# Patient Record
Sex: MALE | Race: WHITE | NOT HISPANIC OR LATINO | Employment: STUDENT | URBAN - METROPOLITAN AREA
[De-identification: names, ages, dates, MRNs, and addresses within clinical notes are randomized per-mention and may not be internally consistent; named-entity substitution may affect disease eponyms.]

---
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
## 2025-05-29 ENCOUNTER — OFFICE VISIT (OUTPATIENT)
Dept: URGENT CARE | Facility: CLINIC | Age: 20
End: 2025-05-29

## 2025-05-29 VITALS
TEMPERATURE: 98.2 F | RESPIRATION RATE: 18 BRPM | SYSTOLIC BLOOD PRESSURE: 117 MMHG | HEART RATE: 108 BPM | WEIGHT: 152.2 LBS | HEIGHT: 66 IN | DIASTOLIC BLOOD PRESSURE: 75 MMHG | OXYGEN SATURATION: 98 % | BODY MASS INDEX: 24.46 KG/M2

## 2025-05-29 DIAGNOSIS — S39.012A STRAIN OF MUSCLE, FASCIA AND TENDON OF LOWER BACK, INITIAL ENCOUNTER: Primary | ICD-10-CM

## 2025-05-29 PROCEDURE — 99214 OFFICE O/P EST MOD 30 MIN: CPT

## 2025-05-29 RX ORDER — METHOCARBAMOL 500 MG/1
500 TABLET, FILM COATED ORAL 4 TIMES DAILY
Qty: 24 TABLET | Refills: 0 | Status: SHIPPED | OUTPATIENT
Start: 2025-05-29

## 2025-05-29 RX ORDER — PREDNISONE 10 MG/1
TABLET ORAL
Qty: 15 TABLET | Refills: 0 | Status: SHIPPED | OUTPATIENT
Start: 2025-05-29 | End: 2025-06-03

## 2025-05-29 NOTE — LETTER
May 29, 2025     Patient: Dg Garrett   YOB: 2005   Date of Visit: 5/29/2025       To Whom it May Concern:    Dg Garrett was seen in my clinic on 5/29/2025. He may return to work on 5/31/25.    If you have any questions or concerns, please don't hesitate to call.         Sincerely,          Per Washington PA-C        CC: No Recipients

## 2025-05-29 NOTE — PATIENT INSTRUCTIONS
Take prednisone as prescribed (starting tomorrow) and Robaxin  Do not take prednisone with other NSAIDs  Do not drive or operate heavy machinery while taking Robaxin  Rest (for no longer than 24 hours)  Stretching exercises  Alternate ice and heat  Consider physical therapy if no improvement after 1 week  Follow up with chiropractic or orthopedic if continues >1 month

## 2025-05-30 NOTE — PROGRESS NOTES
[facility]  Name: Dg Garrett      : 2005      MRN: [id number]  Encounter Provider: Per Washington PA-C  Encounter Date: 2025   Encounter department: [facility] [place]  :  Assessment & Plan  Strain of muscle, fascia and tendon of lower back, initial encounter    Orders:    methocarbamol (ROBAXIN) 500 mg tablet; Take 1 tablet (500 mg total) by mouth 4 (four) times a day    predniSONE 10 mg tablet; Take 5 tablets (50 mg total) by mouth daily for 1 day, THEN 4 tablets (40 mg total) daily for 1 day, THEN 3 tablets (30 mg total) daily for 1 day, THEN 2 tablets (20 mg total) daily for 1 day, THEN 1 tablet (10 mg total) daily for 1 day.        Patient Instructions  Follow up with PCP in 3-5 days.  Proceed to  ER if symptoms worsen.    If tests are performed, our office will contact you with results only if changes need to made to the care plan discussed with you at the visit. [facility]    Chief Complaint:   Chief Complaint   Patient presents with    Back Pain     Middle back pain starting Monday. Denies any injuries to the area, other than lifting a chair. Intensity of pain ranges from a 3 to a 10.      History of Present Illness   19-year-old male presenting with left-sided low back pain x 4 days.  Patient states he was lifting a chair in a very narrow staircase which required him to strain a little bit more.  Feels like since then he has been tweaked his back with left-sided low back pain as well as stiffness.  Has been using heating pads as well as ibuprofen and Tylenol with only mild relief.  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  Denies any numbness or tingling or pain radiating down the leg.  Denies any prior incidence of something like this.  Denies any direct trauma to the area.  Denies any  "saddle anesthesia or incontinence.    Back Pain  Pertinent negatives include no chest pain, dysuria, fever, numbness or weakness.         Review of Systems   Constitutional:  Negative for chills, fatigue, fever and unexpected weight change.   HENT:  Negative for congestion and sore throat.    Respiratory:  Negative for cough and shortness of breath.    Cardiovascular:  Negative for chest pain and palpitations.   Gastrointestinal:  Negative for diarrhea, nausea and vomiting.   Genitourinary:  Negative for decreased urine volume, dysuria and frequency.   Musculoskeletal:  Positive for back pain.   Skin:  Negative for wound.   Neurological:  Negative for weakness, light-headedness and numbness.     Past Medical History   Past Medical History[1]  Past Surgical History[2]  Family History[3]  he reports that he has been smoking cigarettes. He uses smokeless tobacco. He reports current alcohol use. He reports current drug use. Drug: Marijuana.  Current Outpatient Medications   Medication Instructions    cetirizine (ZYRTEC) 10 mg, As needed    IBUPROFEN PO Take by mouth    methocarbamol (ROBAXIN) 500 mg, Oral, 4 times daily    predniSONE 10 mg tablet Take 5 tablets (50 mg total) by mouth daily for 1 day, THEN 4 tablets (40 mg total) daily for 1 day, THEN 3 tablets (30 mg total) daily for 1 day, THEN 2 tablets (20 mg total) daily for 1 day, THEN 1 tablet (10 mg total) daily for 1 day.   Allergies[4]     Objective   /75   Pulse (!) 108   Temp 98.2 °F (36.8 °C)   Resp 18   Ht 5' 6\" (1.676 m)   Wt 69 kg (152 lb 3.2 oz)   SpO2 98%   BMI 24.57 kg/m²      Physical Exam  Vitals and nursing note reviewed.   Constitutional:       General: He is not in acute distress.     Appearance: He is normal weight.   HENT:      Head: Normocephalic and atraumatic.      Right Ear: External ear normal.      Left Ear: External ear normal.      Nose: Nose normal.      Mouth/Throat:      Mouth: Mucous membranes are moist.      Pharynx: " "Oropharynx is clear.     Eyes:      Conjunctiva/sclera: Conjunctivae normal.      Pupils: Pupils are equal, round, and reactive to light.       Cardiovascular:      Rate and Rhythm: Normal rate and regular rhythm.      Pulses: Normal pulses.   Pulmonary:      Effort: Pulmonary effort is normal.     Musculoskeletal:      Comments: No midline thoracic or lumbar tenderness.  Neurovascularly intact with equal bilateral sensation.  No visible spasms bruising or swelling noticeable on exam.  Tenderness to palpation along the left paraspinal muscles at this time.  Negative straight leg raise.     Skin:     General: Skin is warm and dry.      Capillary Refill: Capillary refill takes less than 2 seconds.     Neurological:      General: No focal deficit present.      Mental Status: He is alert and oriented to person, place, and time.      Cranial Nerves: No cranial nerve deficit.      Sensory: No sensory deficit.      Motor: No weakness.     Psychiatric:         Mood and Affect: Mood normal.         Behavior: Behavior normal.         Portions of the record may have been created with voice recognition software.  Occasional wrong word or \"sound a like\" substitutions may have occurred due to the inherent limitations of voice recognition software.  Read the chart carefully and recognize, using context, where substitutions have occurred.       [1]   Past Medical History:  Diagnosis Date    Known health problems: none    [2]   Past Surgical History:  Procedure Laterality Date    NO PAST SURGERIES     [3]   Family History  Problem Relation Name Age of Onset    Heart disease Mother      Hyperlipidemia Mother     [4] No Known Allergies    "